# Patient Record
Sex: MALE | Race: OTHER | Employment: OTHER | URBAN - METROPOLITAN AREA
[De-identification: names, ages, dates, MRNs, and addresses within clinical notes are randomized per-mention and may not be internally consistent; named-entity substitution may affect disease eponyms.]

---

## 2022-04-19 ENCOUNTER — CONSULTATION/EVALUATION (OUTPATIENT)
Dept: URBAN - METROPOLITAN AREA CLINIC 29 | Facility: CLINIC | Age: 62
End: 2022-04-19

## 2022-04-19 DIAGNOSIS — H25.13: ICD-10-CM

## 2022-04-19 DIAGNOSIS — Z98.890: ICD-10-CM

## 2022-04-19 PROCEDURE — 99499LS LASIK SCREENING

## 2022-04-19 PROCEDURE — 92025NC COMP. CORNEAL TOPO, UNI OR BILAT,

## 2022-04-19 PROCEDURE — 92136 OPHTHALMIC BIOMETRY: CPT

## 2022-04-19 ASSESSMENT — VISUAL ACUITY
OD_GLARE: 20/60
OS_CC: J12
OS_GLARE: 20/60
OU_CC: J1
OD_CC: J2
OS_SC: 20/40
OD_SC: 20/40
OS_PH: 20/25
OD_PH: 20/25

## 2022-04-19 ASSESSMENT — TONOMETRY
OD_IOP_MMHG: 17
OS_IOP_MMHG: 16

## 2022-04-19 NOTE — PATIENT DISCUSSION
The risks, benefits and alternatives of cataract surgery were discussed with the patient. Risks including but not limited to: Infection, retinal detachment, lens dislocation, inflammation, loss of vision, increased pressure and need for further surgery. We discussed all the lens options including monofocal lens, toric lens, multifocal lens, astigmatism correction and other options. The patient understands that they may need glasses for optimal vision with any option. Discussed a possibility of refractive surprise due to previous LASIK. Discussed in depth no guarantee of 20/20 vision . Discussed some need for glasses after surgery. Discussed a possible need for [address] or [employer] exchange if  refractive surprise occurs. Discussed a slightly higher risk of RD due to his myopic eyes ( 26mm AL). Discussed a rare possibility of irreversible vision loss. Discussed elective character of surgery.

## 2022-12-15 ENCOUNTER — EMERGENCY VISIT (OUTPATIENT)
Dept: URBAN - METROPOLITAN AREA CLINIC 34 | Facility: CLINIC | Age: 62
End: 2022-12-15

## 2022-12-15 PROCEDURE — 92201 OPSCPY EXTND RTA DRAW UNI/BI: CPT

## 2022-12-15 PROCEDURE — 92014 COMPRE OPH EXAM EST PT 1/>: CPT

## 2022-12-15 ASSESSMENT — VISUAL ACUITY
OS_SC: 20/30
OD_SC: 20/20-2

## 2022-12-15 ASSESSMENT — TONOMETRY
OS_IOP_MMHG: 14
OD_IOP_MMHG: 10

## 2022-12-19 ENCOUNTER — NEW PATIENT (OUTPATIENT)
Dept: URBAN - METROPOLITAN AREA CLINIC 33 | Facility: CLINIC | Age: 62
End: 2022-12-19

## 2022-12-19 VITALS
SYSTOLIC BLOOD PRESSURE: 156 MMHG | HEIGHT: 72 IN | DIASTOLIC BLOOD PRESSURE: 95 MMHG | HEART RATE: 65 BPM | BODY MASS INDEX: 30.07 KG/M2 | WEIGHT: 222 LBS

## 2022-12-19 PROCEDURE — 92201 OPSCPY EXTND RTA DRAW UNI/BI: CPT

## 2022-12-19 PROCEDURE — 92004 COMPRE OPH EXAM NEW PT 1/>: CPT

## 2022-12-19 PROCEDURE — 92250 FUNDUS PHOTOGRAPHY W/I&R: CPT

## 2022-12-19 ASSESSMENT — VISUAL ACUITY
OS_SC: 20/30+1
OD_SC: 20/20-2
OS_PH: 20/20-1

## 2022-12-19 ASSESSMENT — TONOMETRY
OS_IOP_MMHG: 11
OD_IOP_MMHG: 13

## 2024-12-05 ENCOUNTER — NEW PATIENT (OUTPATIENT)
Age: 64
End: 2024-12-05

## 2024-12-05 DIAGNOSIS — H26.493: ICD-10-CM

## 2024-12-05 DIAGNOSIS — H16.223: ICD-10-CM

## 2024-12-05 DIAGNOSIS — E11.9: ICD-10-CM

## 2024-12-05 DIAGNOSIS — H43.813: ICD-10-CM

## 2024-12-05 PROCEDURE — 99204 OFFICE O/P NEW MOD 45 MIN: CPT

## 2024-12-09 ENCOUNTER — SURGERY/PROCEDURE (OUTPATIENT)
Age: 64
End: 2024-12-09

## 2024-12-09 DIAGNOSIS — H26.492: ICD-10-CM

## 2024-12-09 PROCEDURE — 66821 AFTER CATARACT LASER SURGERY: CPT

## 2024-12-23 ENCOUNTER — FOLLOW UP (OUTPATIENT)
Age: 64
End: 2024-12-23

## 2024-12-23 DIAGNOSIS — Z98.890: ICD-10-CM

## 2024-12-23 PROCEDURE — 99024 POSTOP FOLLOW-UP VISIT: CPT
